# Patient Record
Sex: FEMALE | Race: WHITE | NOT HISPANIC OR LATINO | Employment: FULL TIME | URBAN - METROPOLITAN AREA
[De-identification: names, ages, dates, MRNs, and addresses within clinical notes are randomized per-mention and may not be internally consistent; named-entity substitution may affect disease eponyms.]

---

## 2023-10-19 ENCOUNTER — OFFICE VISIT (OUTPATIENT)
Dept: OBGYN CLINIC | Facility: CLINIC | Age: 48
End: 2023-10-19

## 2023-10-19 VITALS
SYSTOLIC BLOOD PRESSURE: 140 MMHG | HEIGHT: 65 IN | HEART RATE: 72 BPM | WEIGHT: 176.6 LBS | DIASTOLIC BLOOD PRESSURE: 83 MMHG | BODY MASS INDEX: 29.42 KG/M2

## 2023-10-19 DIAGNOSIS — M75.101 ROTATOR CUFF SYNDROME, RIGHT: Primary | ICD-10-CM

## 2023-10-19 DIAGNOSIS — M25.511 ACUTE PAIN OF RIGHT SHOULDER: ICD-10-CM

## 2023-10-19 RX ORDER — BUPIVACAINE HYDROCHLORIDE 2.5 MG/ML
4 INJECTION, SOLUTION INFILTRATION; PERINEURAL
Status: COMPLETED | OUTPATIENT
Start: 2023-10-19 | End: 2023-10-19

## 2023-10-19 RX ORDER — BETAMETHASONE SODIUM PHOSPHATE AND BETAMETHASONE ACETATE 3; 3 MG/ML; MG/ML
6 INJECTION, SUSPENSION INTRA-ARTICULAR; INTRALESIONAL; INTRAMUSCULAR; SOFT TISSUE
Status: COMPLETED | OUTPATIENT
Start: 2023-10-19 | End: 2023-10-19

## 2023-10-19 RX ADMIN — BETAMETHASONE SODIUM PHOSPHATE AND BETAMETHASONE ACETATE 6 MG: 3; 3 INJECTION, SUSPENSION INTRA-ARTICULAR; INTRALESIONAL; INTRAMUSCULAR; SOFT TISSUE at 10:00

## 2023-10-19 RX ADMIN — BUPIVACAINE HYDROCHLORIDE 4 ML: 2.5 INJECTION, SOLUTION INFILTRATION; PERINEURAL at 10:00

## 2023-10-19 NOTE — PROGRESS NOTES
Chief Complaint: Right shoulder and arm pain    HPI:    Marcel Crum is a 46year old Female who presents today for evaluation of right shoulder and arm pain. Patient is right-hand dominant. Description of symptoms: Patient reports a work-related injury sustained on 9/15/2023. 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 She does not recollect acute popping but has subsequently noted intermittent popping sensation of the right shoulder. Does not have any known history of previous right shoulder or arm pain. Does not have any known history of previous right shoulder injury or surgery. I have personally reviewed pertinent films in PACS and my interpretation is plain radiograph of the right shoulder performed today does not reveal any acute osseous injury. Minimal acromioclavicular degenerative change noted. .    There is no problem list on file for this patient. No current outpatient medications on file prior to visit. No current facility-administered medications on file prior to visit.         Allergies   Allergen Reactions    Ja Flavor - Food Allergy Hives, Itching, Lip Swelling, Swelling and Tongue Swelling        Tobacco Use: Low Risk  (10/19/2023)    Patient History     Smoking Tobacco Use: Never     Smokeless Tobacco Use: Never     Passive Exposure: Not on file        Social Determinants of Health     Tobacco Use: Low Risk  (10/19/2023)    Patient History     Smoking Tobacco Use: Never     Smokeless Tobacco Use: Never     Passive Exposure: Not on file   Alcohol Use: Not on file   Financial Resource Strain: Not on file   Food Insecurity: Not on file   Transportation Needs: Not on file   Physical Activity: Not on file   Stress: Not on file   Social Connections: Not on file   Intimate Partner Violence: Not on file   Depression: Not on file   Housing Stability: Not on file   Utilities: Not on file               Review of Systems     Body mass index is 29.39 kg/m². Physical Exam  Vitals and nursing note reviewed. HENT:      Head: Atraumatic. Eyes:      Conjunctiva/sclera: Conjunctivae normal.   Cardiovascular:      Rate and Rhythm: Normal rate. Pulses: Normal pulses. Pulmonary:      Effort: Pulmonary effort is normal. No respiratory distress. Neurological:      Mental Status: She is alert and oriented to person, place, and time. Psychiatric:         Mood and Affect: Mood normal.         Behavior: Behavior normal.          Ortho Exam:    Body part: right shoulder    Inspection: No clinically visible deformity    Palpation: Tenderness to palpation over the subacromial space, long head of the biceps and mild tenderness over the acromioclavicular joint    Range of motion: Forward flexion is 160 degrees, abduction is 150 degrees, internal rotation is at the T12 level with discomfort. Normal cross adduction with some discomfort, external rotation in adduction is 75 degrees. Special Tests: Positive Stoddard. Positive Neer's. No apprehension. Minimal discomfort with acromioclavicular Jobes compression test.  Mild discomfort with empty can test.  No discomfort with belly press test.  Rotator cuff strength is supraspinatus 4+/5, subscapularis 5/5, infraspinatus 4+/5, teres minor 5/5. Distal Neurovascular Status: Intact, Yes    Large joint arthrocentesis: R subacromial bursa  Universal Protocol:  Consent: Verbal consent obtained. Risks and benefits: risks, benefits and alternatives were discussed  Consent given by: patient  Patient understanding: patient states understanding of the procedure being performed  Site marked: the operative site was marked  Radiology Images displayed and confirmed.  If images not available, report reviewed: imaging studies available  Required items: required blood products, implants, devices, and special equipment available  Patient identity confirmed: verbally with patient  Supporting Documentation  Indications: pain   Procedure Details  Location: shoulder - R subacromial bursa  Preparation: Patient was prepped and draped in the usual sterile fashion  Needle size: 22 G  Ultrasound guidance: no  Approach: lateral  Medications administered: 4 mL bupivacaine 0.25 %; 6 mg betamethasone acetate-betamethasone sodium phosphate 6 (3-3) mg/mL    Patient tolerance: patient tolerated the procedure well with no immediate complications  Dressing:  Sterile dressing applied             Assessment:     Diagnosis ICD-10-CM Associated Orders   1. Rotator cuff syndrome, right  M75.101 Ambulatory Referral to Physical Therapy      2. Acute pain of right shoulder  M25.511 Ambulatory Referral to Physical Therapy           Plan:    Explained my current clinical findings and reviewed the radiological findings with the patient today. Her evaluation is consistent with a right rotator cuff syndrome. She was agreeable for trial of right subacromial cortisone injection which was performed on today's office visit without immediate complications. I will also refer her for course of physical therapy rehabilitation. We will see her back in about 7 weeks if symptoms serially persist we will consider doing MRI of the right shoulder. Patient expressed understanding and was in agreement with the treatment plan. Portions of the record may have been created with voice recognition software. Occasional wrong word or "sound alike" substitutions may have occurred due to the inherent limitations of voice recognition software. Please review the chart carefully and recognize, using context, where substitutions/typographical errors may have occurred.

## 2023-11-03 ENCOUNTER — EVALUATION (OUTPATIENT)
Dept: PHYSICAL THERAPY | Facility: CLINIC | Age: 48
End: 2023-11-03
Payer: OTHER MISCELLANEOUS

## 2023-11-03 DIAGNOSIS — M54.12 CERVICAL RADICULOPATHY: Primary | ICD-10-CM

## 2023-11-03 DIAGNOSIS — M25.511 ACUTE PAIN OF RIGHT SHOULDER: ICD-10-CM

## 2023-11-03 DIAGNOSIS — M75.101 ROTATOR CUFF SYNDROME, RIGHT: ICD-10-CM

## 2023-11-03 PROCEDURE — 97140 MANUAL THERAPY 1/> REGIONS: CPT | Performed by: PHYSICAL THERAPIST

## 2023-11-03 PROCEDURE — 97012 MECHANICAL TRACTION THERAPY: CPT | Performed by: PHYSICAL THERAPIST

## 2023-11-03 PROCEDURE — 97162 PT EVAL MOD COMPLEX 30 MIN: CPT | Performed by: PHYSICAL THERAPIST

## 2023-11-03 NOTE — PROGRESS NOTES
PT Evaluation     Today's date: 11/3/2023  Patient name: Allie Jefferson  : 1975  MRN: [id number]  Referring provider: Dusty Carmichael MD  Dx:   Encounter Diagnosis     ICD-10-CM    1. Rotator cuff syndrome, right  M75.101 Ambulatory Referral to Physical Therapy      2. Acute pain of right shoulder  M25.511 Ambulatory Referral to Physical Therapy                     Assessment  Assessment details: Signs and symptoms are consistent with cervical radiculopathy due to decreased cervical ROM, impaired RUE strength and cervical extension preference. The patient has difficulty with reaching, lifting and sleeping and this is limiting her ability to complete ADLs and work activities. The patient has limited CS ROM so this will be the first thing we focus on as we begin PT. As this improves we will then focus on improving her postural strength to help better support the CS. The patient would benefit from PT to help restore ROM, strength and joint mobility. Impairments: abnormal or restricted ROM, activity intolerance, impaired physical strength, pain with function and poor posture   Understanding of Dx/Px/POC: excellent  Goals  STG : (2 weeks) - Patient demonstrates independence with HEP to be able to transition to HEP in the long term. (2 weeks) - Decrease patient's pain by 50%. (4 weeks) - Improve cervical ROM to [facility] to decrease stress on the cervical spine. LTG: (6 weeks) - The patient is able to reach above head with minimal pain. (8 weeks) - Improve patient's postural and shoulder strength to at least 4/5 to allow the patient to complete job activities with minimal pain.          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 The patient had a pop in the R shoulder and she had some pretty immediate pain. The patient went to see the doctor and the doctor believes that it is not a severe tear because she still has decent ROM of the shoulder. The patient had numbness in her RUE for about a week after the incident but this has gotten better. She still will get numbness if she over uses her shoulder. The patient has issues with sleeping, reaching, lifting and getting dressed. Sleeping will sometimes cause her arm to go numb. The patient recently saw the doctor and had a cortisone injection and this seemed to help with her pain. Her pain is constant but it fluctuates based off of what she is doing. The patient has been taking ibuprofen and this seems to help some. Originally after the injury she was using heat and ice but has not done this as much recently. Quality of life: excellent    Patient Goals  Patient goal: The patient wants her arm to get stronger and have less pain during ADLs/work activties  Pain  Current pain ratin  At best pain ratin  At worst pain ratin  Location: R posterior shoulder pain. Quality: throbbing  Relieving factors: medications and heat          Objective     Static Posture     Head  Forward. Shoulders  Rounded.     Active Range of Motion   Cervical/Thoracic Spine       Cervical    Flexion: 50 degrees  with pain  Extension: 45 degrees     with pain  Left lateral flexion: 25 degrees     with pain  Right lateral flexion: 25 degrees     with pain  Left rotation: 52 degrees with pain  Right rotation: 55 degrees     Left Shoulder   Normal active range of motion    Right Shoulder   Normal active range of motion  Mechanical Assessment    Cervical    Seated retraction: repeated movements Pain location: centralized  Pain intensity: worse  Seated Flexion:  repeated movements  Pain location: no change    Thoracic      Lumbar      Strength/Myotome Testing     Left Shoulder     Planes of Motion   Flexion: 4-   Extension: 3+   Abduction: 4-   External rotation at 0°: 4-   Internal rotation at 0°: 4     Right Shoulder     Planes of Motion   Flexion: 3+   Extension: 3+   Abduction: 4-   External rotation at 0°: 4-   Internal rotation at 0°: 4     Left Elbow   Flexion: 4-  Extension: 4    Right Elbow   Flexion: 4-  Extension: 4    Additional Strength Details  Pain with R shoulder flexion, ABD and ER             Precautions:     Diagnosis:    Precautions:    Primary Goals:    *asterisks by exercise = given for HEP   Manuals 11/3       CS traction AB                                       There Ex                                                                                Neuro Re-Ed                                                                                                         Re-evaluation              Ther Act                                         Modalities             [facility] 26# max, 13# min

## 2023-11-07 ENCOUNTER — OFFICE VISIT (OUTPATIENT)
Dept: PHYSICAL THERAPY | Facility: CLINIC | Age: 48
End: 2023-11-07
Payer: OTHER MISCELLANEOUS

## 2023-11-07 DIAGNOSIS — M25.511 ACUTE PAIN OF RIGHT SHOULDER: ICD-10-CM

## 2023-11-07 DIAGNOSIS — M54.12 CERVICAL RADICULOPATHY: Primary | ICD-10-CM

## 2023-11-07 PROCEDURE — 97140 MANUAL THERAPY 1/> REGIONS: CPT | Performed by: PHYSICAL THERAPIST

## 2023-11-07 PROCEDURE — 97012 MECHANICAL TRACTION THERAPY: CPT | Performed by: PHYSICAL THERAPIST

## 2023-11-07 PROCEDURE — 97110 THERAPEUTIC EXERCISES: CPT | Performed by: PHYSICAL THERAPIST

## 2023-11-07 NOTE — PROGRESS NOTES
Daily Note     Today's date: 2023  Patient name: Leslee Bearden  : 1975  MRN: [id number]  Referring provider: Linda Kelley MD  Dx:   Encounter Diagnosis     ICD-10-CM    1. Cervical radiculopathy  M54.12       2. Acute pain of right shoulder  M25.511                      Subjective: The patient reports that after the traction last session she felt a little better the next day. Objective: See treatment diary below      Assessment: Tolerated treatment well. Patient demonstrated fatigue post treatment and would benefit from continued PT    The patient responded very well to manual techniques today as it helped to centralize her symptoms. We went through a few exercises that were all added to her HEP before ending the session with mechanical traction. Plan: Continue per plan of care.       Precautions:     Diagnosis:    Precautions:    Primary Goals:    *asterisks by exercise = given for HEP   Manuals 11/3 11/7      CS traction AB       STM mid/upper trap  AB      TS PA mobs  Gr III-IV      Man CS ret  AB              There Ex        CS ret  x20      Upper TS ext  5"x15                                                              Neuro Re-Ed        Rows  RTB x20                                                                                               Re-evaluation              Ther Act                                         Modalities             Mech Traction Intermittent 26# max, 13# min Intermittent 26# max, 13# min

## 2023-11-09 ENCOUNTER — OFFICE VISIT (OUTPATIENT)
Dept: PHYSICAL THERAPY | Facility: CLINIC | Age: 48
End: 2023-11-09
Payer: OTHER MISCELLANEOUS

## 2023-11-09 DIAGNOSIS — M25.511 ACUTE PAIN OF RIGHT SHOULDER: ICD-10-CM

## 2023-11-09 DIAGNOSIS — M54.12 CERVICAL RADICULOPATHY: Primary | ICD-10-CM

## 2023-11-09 PROCEDURE — 97140 MANUAL THERAPY 1/> REGIONS: CPT | Performed by: PHYSICAL THERAPIST

## 2023-11-09 PROCEDURE — 97112 NEUROMUSCULAR REEDUCATION: CPT | Performed by: PHYSICAL THERAPIST

## 2023-11-09 PROCEDURE — 97012 MECHANICAL TRACTION THERAPY: CPT | Performed by: PHYSICAL THERAPIST

## 2023-11-09 NOTE — PROGRESS NOTES
Daily Note     Today's date: 2023  Patient name: Marcel Crum  : 1975  MRN: [id number]  Referring provider: Luke Carmichael MD  Dx:   Encounter Diagnosis     ICD-10-CM    1. Cervical radiculopathy  M54.12       2. Acute pain of right shoulder  M25.511                      Subjective: The patient reports that her shoulder is feeling better and is now only a 5-6/10 pain. Objective: See treatment diary below      Assessment: Tolerated treatment well. Patient demonstrated fatigue post treatment and would benefit from continued PT        Plan: Continue per plan of care.       Precautions:     Diagnosis:    Precautions:    Primary Goals:    *asterisks by exercise = given for HEP   Manuals 11/3 11/7 11/9     CS traction AB       STM mid/upper trap  AB AA     TS PA mobs  Gr III-IV Gr III-IV     Man CS ret  AB AB             There Ex        CS ret  x20      Upper TS ext  5"x15                                                              Neuro Re-Ed        Rows  RTB x20      No Moneys   GTB x20     Horizontal ABD   GTB x20     Prone I's   5"x10     Prone T's   5"x10                                                              Re-evaluation              Ther Act                                         Modalities             Mech Traction Intermittent 26# max, 13# min Intermittent 26# max, 13# min Intermittent 26# max, 13# min

## 2023-11-13 ENCOUNTER — APPOINTMENT (OUTPATIENT)
Dept: URGENT CARE | Facility: CLINIC | Age: 48
End: 2023-11-13

## 2023-11-14 ENCOUNTER — APPOINTMENT (OUTPATIENT)
Dept: PHYSICAL THERAPY | Facility: CLINIC | Age: 48
End: 2023-11-14
Payer: OTHER MISCELLANEOUS

## 2023-11-16 ENCOUNTER — OFFICE VISIT (OUTPATIENT)
Dept: PHYSICAL THERAPY | Facility: CLINIC | Age: 48
End: 2023-11-16
Payer: OTHER MISCELLANEOUS

## 2023-11-16 DIAGNOSIS — M25.511 ACUTE PAIN OF RIGHT SHOULDER: ICD-10-CM

## 2023-11-16 DIAGNOSIS — M54.12 CERVICAL RADICULOPATHY: Primary | ICD-10-CM

## 2023-11-16 PROCEDURE — 97012 MECHANICAL TRACTION THERAPY: CPT | Performed by: PHYSICAL THERAPIST

## 2023-11-16 PROCEDURE — 97110 THERAPEUTIC EXERCISES: CPT | Performed by: PHYSICAL THERAPIST

## 2023-11-16 PROCEDURE — 97140 MANUAL THERAPY 1/> REGIONS: CPT | Performed by: PHYSICAL THERAPIST

## 2023-11-16 PROCEDURE — 97112 NEUROMUSCULAR REEDUCATION: CPT | Performed by: PHYSICAL THERAPIST

## 2023-11-16 NOTE — PROGRESS NOTES
Daily Note     Today's date: 2023  Patient name: Anamaria Mahmood  : 1975  MRN: [id number]  Referring provider: Jessica Dean MD  Dx: No diagnosis found. Subjective: Patient reports major improvements in pain and numbness that was going down her arm. She states that the only thing she feels is some tightness at the end of working a shift but she does some stretches to relieve it. Objective: See treatment diary below      Assessment: Tolerated treatment well. Patient would benefit from continued PT    Patient continues to make progress toward goals. Since patient is showing improvements in symptoms, the plan is to add in more strengthening exercises next visit to improve muscle endurance and activity tolerance for working,    Plan: Continue per plan of care.       Precautions:     Diagnosis:    Precautions:    Primary Goals:    *asterisks by exercise = given for HEP   Manuals 11/3 11/7 11/9 11/16    CS traction AB       STM mid/upper trap  AB AA AA    TS PA mobs  Gr III-IV Gr III-IV Gr III-Iv    Man CS ret  AB AB AA            There Ex        CS ret  x20  x20    Upper TS ext  5"x15  5"x15    UT stretch    10x10"                                                    Neuro Re-Ed        Rows  RTB x20  12.5# x20    No Moneys   GTB x20 GTB  x20    Horizontal ABD   GTB x20 GTB x20    Prone I's   5"x10 5"x10    Prone T's   5"x10 5"x10                                                             Re-evaluation              Ther Act                                         Modalities             Mech Traction Intermittent 26# max, 13# min Intermittent 26# max, 13# min Intermittent 26# max, 13# min ntermittent 26# max, 13# min

## 2023-11-20 ENCOUNTER — OFFICE VISIT (OUTPATIENT)
Dept: PHYSICAL THERAPY | Facility: CLINIC | Age: 48
End: 2023-11-20
Payer: OTHER MISCELLANEOUS

## 2023-11-20 DIAGNOSIS — M54.12 CERVICAL RADICULOPATHY: Primary | ICD-10-CM

## 2023-11-20 DIAGNOSIS — M25.511 ACUTE PAIN OF RIGHT SHOULDER: ICD-10-CM

## 2023-11-20 PROCEDURE — 97012 MECHANICAL TRACTION THERAPY: CPT | Performed by: PHYSICAL THERAPIST

## 2023-11-20 PROCEDURE — 97140 MANUAL THERAPY 1/> REGIONS: CPT | Performed by: PHYSICAL THERAPIST

## 2023-11-20 PROCEDURE — 97112 NEUROMUSCULAR REEDUCATION: CPT | Performed by: PHYSICAL THERAPIST

## 2023-11-20 NOTE — PROGRESS NOTES
Daily Note     Today's date: 2023  Patient name: Leslee Bearden  : 1975  MRN: [id number]  Referring provider: Linda Kelley MD  Dx:   Encounter Diagnosis     ICD-10-CM    1. Cervical radiculopathy  M54.12       2. Acute pain of right shoulder  M25.511                      Subjective: The patient reports that her neck is feeling much better and is only having 3/10 in the upper trap. Objective: See treatment diary below      Assessment: Tolerated treatment well. Patient demonstrated fatigue post treatment and would benefit from continued PT    The patient responds very well to manual techniques as it helps reduce tightness and pain levels. Her postural strength is improving so I added in around the [place] and she demonstrated great scapular activation. Plan: Continue per plan of care.       Precautions:     Diagnosis:    Precautions:    Primary Goals:    *asterisks by exercise = given for HEP   Manuals 11/3 11/7 11/9 11/16 11/20   CS traction AB       STM mid/upper trap  AB AA AA AB   TS PA mobs  Gr III-IV Gr III-IV Gr III-Iv Gr III-IV   Man CS ret  AB AB AA AB           There Ex        CS ret  x20  x20    Upper TS ext  5"x15  5"x15 5"X15   UT stretch    10x10"                                                    Neuro Re-Ed        Rows  RTB x20  12.5# x20    No Moneys   GTB x20 GTB  x20 GTB x20   Horizontal ABD   GTB x20 GTB x20 GTB x20   Prone I's   5"x10 5"x10 5"x10   Prone T's   5"x10 5"x10 5"x10   Around [place]     GTB x10                                                    Re-evaluation              Ther Act                                         Modalities             Mech Traction Intermittent 26# max, 13# min Intermittent 26# max, 13# min Intermittent 26# max, 13# min intermittent 26# max, 13# min Intermittent 26# max, 13# min

## 2023-11-22 ENCOUNTER — OFFICE VISIT (OUTPATIENT)
Dept: PHYSICAL THERAPY | Facility: CLINIC | Age: 48
End: 2023-11-22
Payer: OTHER MISCELLANEOUS

## 2023-11-22 DIAGNOSIS — M25.511 ACUTE PAIN OF RIGHT SHOULDER: ICD-10-CM

## 2023-11-22 DIAGNOSIS — M54.12 CERVICAL RADICULOPATHY: Primary | ICD-10-CM

## 2023-11-22 PROCEDURE — 97110 THERAPEUTIC EXERCISES: CPT | Performed by: PHYSICAL THERAPIST

## 2023-11-22 PROCEDURE — 97012 MECHANICAL TRACTION THERAPY: CPT | Performed by: PHYSICAL THERAPIST

## 2023-11-22 PROCEDURE — 97140 MANUAL THERAPY 1/> REGIONS: CPT | Performed by: PHYSICAL THERAPIST

## 2023-11-22 PROCEDURE — 97112 NEUROMUSCULAR REEDUCATION: CPT | Performed by: PHYSICAL THERAPIST

## 2023-11-22 NOTE — PROGRESS NOTES
Daily Note     Today's date: 2023  Patient name: Kimberly Landeros  : 1975  MRN: [id number]  Referring provider: Angie Price MD  Dx:   Encounter Diagnosis     ICD-10-CM    1. Cervical radiculopathy  M54.12       2. Acute pain of right shoulder  M25.511           Start Time: 1415  Stop Time: 1505  Total time in clinic (min): 50 minutes    Treatment provided by Myra Gómez SPT under direct supervision of Nicolas Choi, PT, DPT. Subjective: Patient says she feels good today. She believes she will be okay to discharge next week. The only thing symptoms she feel are residual soreness after a long day of work. Objective: See treatment diary below      Assessment: Tolerated treatment well. Patient would benefit from continued PT    Patient is showing improvements in pain and strength evidenced by a progression to BTB for no moneys. Wall angels were added in today which the patient performed well with minimal cueing. Plan: Continue per plan of care. Plan for discharge next week.      Precautions:   Diagnosis:    Precautions:    Primary Goals:    *asterisks by exercise = given for HEP   Manuals    CS traction        STM mid/upper trap AA AB AA AA AB   TS PA mobs AA Gr III-IV Gr III-IV Gr III-Iv Gr III-IV   Man CS ret AA AB AB AA AB           There Ex        CS ret x20 x20  x20    Upper TS ext 15x5" 5"x15  5"x15 5"X15   UT stretch    10x10"                                                    Neuro Re-Ed        Rows 12.5# x20 RTB x20  12.5# x20    No Moneys BTB x20  GTB x20 GTB  x20 GTB x20   Horizontal ABD GTB x20  GTB x20 GTB x20 GTB x20   Prone I's   5"x10 5"x10 5"x10   Prone T's   5"x10 5"x10 5"x10   Around [place] GTB x10    GTB x10   Wall [place] x20                                                Re-evaluation              Ther Act                                         Modalities             Mech Traction Intermittent 26# max, 13# min Intermittent 26# max, 13# min Intermittent 26# max, 13# min intermittent 26# max, 13# min Intermittent 26# max, 13# min

## 2023-11-28 ENCOUNTER — OFFICE VISIT (OUTPATIENT)
Dept: PHYSICAL THERAPY | Facility: CLINIC | Age: 48
End: 2023-11-28
Payer: OTHER MISCELLANEOUS

## 2023-11-28 DIAGNOSIS — M54.12 CERVICAL RADICULOPATHY: Primary | ICD-10-CM

## 2023-11-28 DIAGNOSIS — M25.511 ACUTE PAIN OF RIGHT SHOULDER: ICD-10-CM

## 2023-11-28 PROCEDURE — 97140 MANUAL THERAPY 1/> REGIONS: CPT | Performed by: PHYSICAL THERAPIST

## 2023-11-28 PROCEDURE — 97012 MECHANICAL TRACTION THERAPY: CPT | Performed by: PHYSICAL THERAPIST

## 2023-11-28 PROCEDURE — 97110 THERAPEUTIC EXERCISES: CPT | Performed by: PHYSICAL THERAPIST

## 2023-11-28 PROCEDURE — 97112 NEUROMUSCULAR REEDUCATION: CPT | Performed by: PHYSICAL THERAPIST

## 2023-11-28 NOTE — PROGRESS NOTES
Daily Note     Today's date: 2023  Patient name: Chula Keller  : 1975  MRN: [id number]  Referring provider: Oscar Jara MD  Dx:   Encounter Diagnosis     ICD-10-CM    1. Cervical radiculopathy  M54.12       2. Acute pain of right shoulder  M25.511           Start Time: 1700  Stop Time: 1755  Total time in clinic (min): 55 minutes  Treatment provided by Morena Subramanian SPT under direct supervision of Maurice Kaur, PT, DPT. Subjective: Patient reports her neck and shoulder has been feeling good consistently. Objective: See treatment diary below      Assessment: Tolerated treatment well. Patient would benefit from continued PT    Patient continues to show improvements in pain, cervical ROM, and strength. She is able to complete the exercises with little to no pain and demonstrates good technique with occasional cueing. Plan: Potential discharge next visit.      Precautions:   Diagnosis:    Precautions:    Primary Goals:    *asterisks by exercise = given for HEP   Manuals    CS traction        STM mid/upper trap AA AA AA AA AB   TS PA mobs AA Gr III-IV Gr III-IV Gr III-Iv Gr III-IV   Man CS ret AA AA AB AA AB           There Ex        CS ret x20 x20  x20    Upper TS ext 15x5" 5"x15  5"x15 5"X15   UT stretch    10x10"                                                    Neuro Re-Ed        Rows 12.5# x20 12.5# x20  12.5# x20    No Moneys BTB x20 BTB x20 GTB x20 GTB  x20 GTB x20   Horizontal ABD GTB x20 GTB x20 GTB x20 GTB x20 GTB x20   Prone I's  5"x15 5"x10 5"x10 5"x10   Prone T's  5"x15 5"x10 5"x10 5"x10   Around the [place] GTB x10 GTB x15   GTB x10   Wall [place] x20 x20                                               Re-evaluation              Ther Act                                         Modalities             Mech Traction Intermittent 26# max, 13# min Intermittent 26# max, 13# min Intermittent 26# max, 13# min intermittent 26# max, 13# min Intermittent 26# max, 13# min

## 2023-11-30 ENCOUNTER — APPOINTMENT (OUTPATIENT)
Dept: PHYSICAL THERAPY | Facility: CLINIC | Age: 48
End: 2023-11-30
Payer: OTHER MISCELLANEOUS

## 2023-12-04 ENCOUNTER — OFFICE VISIT (OUTPATIENT)
Dept: OBGYN CLINIC | Facility: OTHER | Age: 48
End: 2023-12-04
Payer: OTHER MISCELLANEOUS

## 2023-12-04 VITALS
HEART RATE: 63 BPM | BODY MASS INDEX: 29.42 KG/M2 | HEIGHT: 65 IN | DIASTOLIC BLOOD PRESSURE: 79 MMHG | SYSTOLIC BLOOD PRESSURE: 116 MMHG | WEIGHT: 176.6 LBS

## 2023-12-04 DIAGNOSIS — M75.101 ROTATOR CUFF SYNDROME, RIGHT: Primary | ICD-10-CM

## 2023-12-04 PROCEDURE — 99213 OFFICE O/P EST LOW 20 MIN: CPT | Performed by: ORTHOPAEDIC SURGERY

## 2023-12-04 NOTE — PROGRESS NOTES
Chief Complaint: Right shoulder pain follow-up    HPI:    Yessi Bean is a 46year old Female who presents today for follow-up of right shoulder pain      Description of symptoms: Last seen in this regard on 10/19/2023. Patient reports a work-related injury sustained on 9/15/2023. 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 Clinical evaluation was consistent with right rotator cuff syndrome. Patient received a right subacromial cortisone injection at the last office visit. She has also been doing physical therapy rehabilitation. Today, the patient reports that she has good improvement of her symptoms following the above management. Currently denies any significant neck or shoulder pain and denies any functional limitation of the right upper extremity. I have personally reviewed pertinent films in PACS. There is no problem list on file for this patient. No current outpatient medications on file prior to visit. No current facility-administered medications on file prior to visit.         Allergies   Allergen Reactions    [place] Flavor - Food Allergy Hives, Itching, Lip Swelling, Swelling and Tongue Swelling        Tobacco Use: Low Risk  (11/3/2023)    Patient History     Smoking Tobacco Use: Never     Smokeless Tobacco Use: Never     Passive Exposure: Not on file        Social Determinants of Health     Tobacco Use: Low Risk  (11/3/2023)    Patient History     Smoking Tobacco Use: Never     Smokeless Tobacco Use: Never     Passive Exposure: Not on file   Alcohol Use: Not on file   Financial Resource Strain: Not on file   Food Insecurity: Not on file   Transportation Needs: Not on file   Physical Activity: Not on file   Stress: Not on file   Social Connections: Not on file   Intimate Partner Violence: Not on file   Depression: Not on file   Housing Stability: Not on file   Utilities: Not on file               Review of Systems     There is no height or weight on file to calculate BMI. Physical Exam  Vitals and nursing note reviewed. HENT:      Head: Atraumatic. Eyes:      Conjunctiva/sclera: Conjunctivae normal.   Cardiovascular:      Rate and Rhythm: Normal rate. Pulses: Normal pulses. Pulmonary:      Effort: Pulmonary effort is normal. No respiratory distress. Neurological:      Mental Status: She is alert and oriented to person, place, and time. Psychiatric:         Mood and Affect: Mood normal.         Behavior: Behavior normal.          Ortho Exam:    Body part: right shoulder    Inspection: No deformity or muscle atrophy    Palpation: No areas of tenderness    Range of motion: Full range of right shoulder motion in all directions    Special Tests: Rotator cuff strength is 5/5 in all groups. Negative Neer's. Negative Stoddard. No anterior apprehension. Negative acromioclavicular Teresa's compression test.  Negative Morgan's. Distal Neurovascular Status: Intact, Yes    Body part: Cervical spine    Inspection: No deformity    Palpation: No midline tenderness    Range of motion: Full range of cervical spine motion in all directions    Special Tests: Negative Spurling's maneuver bilaterally. Negative cervical axial load test.    Distal Neurovascular Status: Intact, Yes    Procedures       Assessment:     Diagnosis ICD-10-CM Associated Orders   1. Rotator cuff syndrome, right  M75.101            Plan:    Explained my current clinical findings to the patient. She has had good relief of her right shoulder pain following physical therapy and subacromial cortisone injection. At this time I have advised her to continue with home-based exercises. She will follow-up on an as-needed basis. Patient expressed understanding and was in agreement with the treatment plan. Portions of the record may have been created with voice recognition software.  Occasional wrong word or "sound alike" substitutions may have occurred due to the inherent limitations of voice recognition software. Please review the chart carefully and recognize, using context, where substitutions/typographical errors may have occurred.

## 2023-12-06 ENCOUNTER — OFFICE VISIT (OUTPATIENT)
Dept: PHYSICAL THERAPY | Facility: CLINIC | Age: 48
End: 2023-12-06
Payer: OTHER MISCELLANEOUS

## 2023-12-06 DIAGNOSIS — M54.12 CERVICAL RADICULOPATHY: Primary | ICD-10-CM

## 2023-12-06 DIAGNOSIS — M25.511 ACUTE PAIN OF RIGHT SHOULDER: ICD-10-CM

## 2023-12-06 PROCEDURE — 97140 MANUAL THERAPY 1/> REGIONS: CPT | Performed by: PHYSICAL THERAPIST

## 2023-12-06 PROCEDURE — 97110 THERAPEUTIC EXERCISES: CPT | Performed by: PHYSICAL THERAPIST

## 2023-12-06 NOTE — PROGRESS NOTES
PT Discharge    Today's date: 2023  Patient name: Kimberly Landeros  : 1975  MRN: [id number]  Referring provider: Angie Price MD  Dx:   Encounter Diagnosis     ICD-10-CM    1. Cervical radiculopathy  M54.12       2. Acute pain of right shoulder  M25.511                      Assessment  Assessment details: The patient has made great progress since the start of PT. Her CS ROM is doing great and she only had minor discomfort with right side bending. Her strength is also significiantly better and this is helping to support her during ADLs and work activities. Her pain levels are much lower and under control. Overall the patient is doing excellent so me and the patient both agree she is ready to be discharged. Impairments: abnormal or restricted ROM, activity intolerance, impaired physical strength, pain with function and poor posture   Understanding of Dx/Px/POC: excellent  Goals  STG : (2 weeks) - Patient demonstrates independence with HEP to be able to transition to [facility] in the long term. - 100%  (2 weeks) - Decrease patient's pain by 50%. - 100%  (4 weeks) - Improve cervical ROM to [facility] to decrease stress on the cervical spine. - 100%    LTG: (6 weeks) - The patient is able to reach above head with minimal pain. - 100%  (8 weeks) - Improve patient's postural and shoulder strength to at least 4/5 to allow the patient to complete job activities with minimal pain. 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   The patient had a pop in the R shoulder and she had some pretty immediate pain. The patient went to see the doctor and the doctor believes that it is not a severe tear because she still has decent ROM of the shoulder. The patient had numbness in her RUE for about a week after the incident but this has gotten better. She still will get numbness if she over uses her shoulder. The patient has issues with sleeping, reaching, lifting and getting dressed. Sleeping will sometimes cause her arm to go numb. The patient recently saw the doctor and had a cortisone injection and this seemed to help with her pain. Her pain is constant but it fluctuates based off of what she is doing. The patient has been taking ibuprofen and this seems to help some. Originally after the injury she was using heat and ice but has not done this as much recently. Re-evaluation - The patient reports that her shoulder and neck have been feeling very good. She is having minimal pain and is able to complete her ADLs and work activities with minimal difficulty. She did have some pain the other day when she was carrying heavy hernando decorations up and down the stairs for an hour but after a little rest she quickly recovered. The patent reports that she is feeling 98% better since the start of PT. Quality of life: excellent    Patient Goals  Patient goal: The patient wants her arm to get stronger and have less pain during ADLs/work activties  Pain  Current pain ratin  At best pain ratin  At worst pain rating: 3  Location: R posterior shoulder pain. Quality: throbbing  Relieving factors: medications and heat        Objective     Static Posture     Head  Forward. Shoulders  Rounded.     Active Range of Motion   Cervical/Thoracic Spine       Cervical    Flexion: 65 degrees   Extension: 70 degrees      Left lateral flexion: 40 degrees      Right lateral flexion: 40 degrees     with pain  Left rotation: 60 degrees  Right rotation: 60 degrees Left Shoulder   Normal active range of motion    Right Shoulder   Normal active range of motion  Mechanical Assessment    Cervical    Seated retraction: repeated movements   Pain location: centralized  Pain intensity: worse  Seated Flexion:  repeated movements  Pain location: no change    Thoracic      Lumbar      Strength/Myotome Testing     Left Shoulder     Planes of Motion   Flexion: 4   Extension: 4   Abduction: 4   External rotation at 0°: 4   Internal rotation at 0°: 4     Right Shoulder     Planes of Motion   Flexion: 4   Extension: 4   Abduction: 4   External rotation at 0°: 4   Internal rotation at 0°: 4+     Left Elbow   Flexion: 4  Extension: 4    Right Elbow   Flexion: 4  Extension: 4    Additional Strength Details  No pain with MMT             Precautions:     Diagnosis:    Precautions:    Primary Goals:    *asterisks by exercise = given for HEP   Manuals 11/22 11/28 11/9 11/16 11/20   CS traction        STM mid/upper trap AA AA AA AA AB   TS PA mobs AA Gr III-IV Gr III-IV Gr III-Iv Gr III-IV   Man CS ret AA AA AB AA AB           There Ex        CS ret x20 x20  x20    Upper TS ext 15x5" 5"x15  5"x15 5"X15   UT stretch    10x10"                                                    Neuro Re-Ed        Rows 12.5# x20 12.5# x20  12.5# x20    No Moneys BTB x20 BTB x20 GTB x20 GTB  x20 GTB x20   Horizontal ABD GTB x20 GTB x20 GTB x20 GTB x20 GTB x20   Prone I's  5"x15 5"x10 5"x10 5"x10   Prone T's  5"x15 5"x10 5"x10 5"x10   Around [employer] [place] GTB x10 GTB x15   GTB x10   Wall [place] x20 x20                                               Re-evaluation              Ther Act                                         Modalities             Mech Traction Intermittent 26# max, 13# min Intermittent 26# max, 13# min Intermittent 26# max, 13# min intermittent 26# max, 13# min Intermittent 26# max, 13# min

## 2024-02-03 ENCOUNTER — HOSPITAL ENCOUNTER (EMERGENCY)
Facility: HOSPITAL | Age: 49
Discharge: HOME | End: 2024-02-03
Attending: EMERGENCY MEDICINE | Admitting: EMERGENCY MEDICINE
Payer: COMMERCIAL

## 2024-02-03 ENCOUNTER — APPOINTMENT (EMERGENCY)
Dept: RADIOLOGY | Facility: HOSPITAL | Age: 49
End: 2024-02-03
Payer: COMMERCIAL

## 2024-02-03 VITALS
OXYGEN SATURATION: 99 % | HEART RATE: 64 BPM | SYSTOLIC BLOOD PRESSURE: 135 MMHG | TEMPERATURE: 97.7 F | DIASTOLIC BLOOD PRESSURE: 90 MMHG | RESPIRATION RATE: 16 BRPM

## 2024-02-03 DIAGNOSIS — M54.32 SCIATICA OF LEFT SIDE: Primary | ICD-10-CM

## 2024-02-03 PROCEDURE — 99283 EMERGENCY DEPT VISIT LOW MDM: CPT

## 2024-02-03 PROCEDURE — 72100 X-RAY EXAM L-S SPINE 2/3 VWS: CPT

## 2024-02-03 PROCEDURE — 96372 THER/PROPH/DIAG INJ SC/IM: CPT

## 2024-02-03 RX ORDER — LIDOCAINE 50 MG/G
1 PATCH TOPICAL DAILY
Qty: 30 PATCH | Refills: 0 | Status: SHIPPED | OUTPATIENT
Start: 2024-02-03

## 2024-02-03 RX ORDER — METHOCARBAMOL 500 MG/1
500 TABLET, FILM COATED ORAL 3 TIMES DAILY PRN
Qty: 20 TABLET | Refills: 0 | Status: SHIPPED | OUTPATIENT
Start: 2024-02-03

## 2024-02-03 RX ORDER — NAPROXEN 500 MG/1
500 TABLET ORAL 2 TIMES DAILY WITH MEALS
Qty: 30 TABLET | Refills: 0 | Status: SHIPPED | OUTPATIENT
Start: 2024-02-03

## 2024-02-03 RX ORDER — ACETAMINOPHEN 325 MG/1
975 TABLET ORAL ONCE
Status: COMPLETED | OUTPATIENT
Start: 2024-02-03 | End: 2024-02-03

## 2024-02-03 RX ORDER — METHOCARBAMOL 500 MG/1
500 TABLET, FILM COATED ORAL ONCE
Status: COMPLETED | OUTPATIENT
Start: 2024-02-03 | End: 2024-02-03

## 2024-02-03 RX ORDER — LIDOCAINE 50 MG/G
2 PATCH TOPICAL ONCE
Status: DISCONTINUED | OUTPATIENT
Start: 2024-02-03 | End: 2024-02-03 | Stop reason: HOSPADM

## 2024-02-03 RX ORDER — ROPIVACAINE HYDROCHLORIDE 5 MG/ML
10 INJECTION, SOLUTION EPIDURAL; INFILTRATION; PERINEURAL ONCE
Status: COMPLETED | OUTPATIENT
Start: 2024-02-03 | End: 2024-02-03

## 2024-02-03 RX ORDER — ACETAMINOPHEN 500 MG
1000 TABLET ORAL EVERY 6 HOURS PRN
Qty: 40 TABLET | Refills: 0 | Status: SHIPPED | OUTPATIENT
Start: 2024-02-03

## 2024-02-03 RX ORDER — KETOROLAC TROMETHAMINE 30 MG/ML
15 INJECTION, SOLUTION INTRAMUSCULAR; INTRAVENOUS ONCE
Status: COMPLETED | OUTPATIENT
Start: 2024-02-03 | End: 2024-02-03

## 2024-02-03 RX ADMIN — ROPIVACAINE HYDROCHLORIDE 10 ML: 5 INJECTION, SOLUTION EPIDURAL; INFILTRATION; PERINEURAL at 13:08

## 2024-02-03 RX ADMIN — LIDOCAINE 2 PATCH: 50 PATCH CUTANEOUS at 13:03

## 2024-02-03 RX ADMIN — METHOCARBAMOL TABLETS 500 MG: 500 TABLET, COATED ORAL at 12:59

## 2024-02-03 RX ADMIN — KETOROLAC TROMETHAMINE 15 MG: 30 INJECTION, SOLUTION INTRAMUSCULAR; INTRAVENOUS at 13:00

## 2024-02-03 RX ADMIN — ACETAMINOPHEN 975 MG: 325 TABLET, FILM COATED ORAL at 12:59

## 2024-02-03 NOTE — DISCHARGE INSTRUCTIONS
Follow-up with [facility] us as possible.    Perform the stretching exercises and walk as often as possible.  Do not do any heavy lifting.    Take the naproxen every 12 hours, Tylenol every 6 hours, lidocaine patches 12 hours on and then a handful of hours off.    Use the Robaxin muscle relaxer 3 times daily as needed for muscle spasms.  This medication will make you tired.  Do not take it and drive.    Come back for neurological deficits such as numbness between the legs, urinary incontinence, urinary retention, bowel problems.

## 2024-02-03 NOTE — ED PROVIDER NOTES
History  Chief Complaint   Patient presents with    Back Pain     Pt presents to ED for back pain starting Tuesday after bending while doing laundry. Saw chiropractor on Friday without relief. OTC medications ineffective.      48-year-old female previous history of hysterectomy, leg surgery.  Patient presents for back pain for last 4 days.  Patient reports that she was leaning forward grabbing laundry 4 days ago.  She felt a pop in her back.  She notes pain primarily in the left paraspinal musculature over the lower lumbar region since then.    She notes tingling/different sensation on the lateral aspect of the left leg.  Notes that the pain makes her feel like her leg is giving out.  Denies subjective weakness.    She denies urinary or bowel complaints.  Denies numbness between the legs.  No cancer history.  Denies fevers.  Denies night sweats.    Has been to chiropractor for relief of symptoms for 1 day.     Has used naproxen and Voltaren without relief of symptoms.      Back Pain      None       History reviewed. No pertinent past medical history.    Past Surgical History:   Procedure Laterality Date    HYSTERECTOMY      LEG SURGERY Left        Family History   Problem Relation Age of Onset    Cancer Mother     Heart disease Father      I have reviewed and agree with the history as documented.    E-Cigarette/Vaping    E-Cigarette Use Never User      E-Cigarette/Vaping Substances     Social History     Tobacco Use    Smoking status: Never    Smokeless tobacco: Never   Vaping Use    Vaping status: Never Used   Substance Use Topics    Alcohol use: Yes    Drug use: Never       Review of Systems   Musculoskeletal:  Positive for back pain.   All other systems reviewed and are negative.      Physical Exam  Physical Exam  Vitals and nursing note reviewed.   Constitutional:       General: She is not in acute distress.     Appearance: Normal appearance. She is not ill-appearing.   HENT:      Head: Normocephalic and  atraumatic.      Right Ear: External ear normal.      Left Ear: External ear normal.      Nose: Nose normal.      Mouth/Throat:      Mouth: Mucous membranes are moist.   Eyes:      General:         Right eye: No discharge.         Left eye: No discharge.      Conjunctiva/sclera: Conjunctivae normal.   Cardiovascular:      Rate and Rhythm: Normal rate and regular rhythm.      Pulses: Normal pulses.      Heart sounds: No murmur heard.  Pulmonary:      Effort: Pulmonary effort is normal.      Breath sounds: Normal breath sounds.   Abdominal:      General: Abdomen is flat. There is no distension.      Tenderness: There is no abdominal tenderness.   Musculoskeletal:         General: Tenderness present. Normal range of motion.      Cervical back: Normal range of motion.      Comments: Positive straight leg raise on the left.    Tenderness with palpation in the paraspinal musculature in the lower lumbar region as well as mild spinal central tenderness around L4.     Skin:     General: Skin is warm.      Capillary Refill: Capillary refill takes less than 2 seconds.      Findings: No rash.   Neurological:      General: No focal deficit present.      Mental Status: She is alert. Mental status is at baseline.      Sensory: No sensory deficit.      Motor: No weakness.      Comments: Normal sensation to light touch at the lower extremities bilaterally.  Muscular strength is 5 out of 5 in lower extremities bilaterally.    No numbness to light touch within the medial thighs bilaterally.   Psychiatric:         Mood and Affect: Mood normal.         Behavior: Behavior normal.         Vital Signs  ED Triage Vitals   Temperature Pulse Respirations Blood Pressure SpO2   02/03/24 1234 02/03/24 1231 02/03/24 1231 02/03/24 1231 02/03/24 1231   97.7 °F (36.5 °C) 64 16 135/90 99 %      Temp Source Heart Rate Source Patient Position - Orthostatic VS BP Location FiO2 (%)   02/03/24 1231 02/03/24 1231 02/03/24 1231 02/03/24 1231 --   Oral  Monitor Sitting Left arm       Pain Score       02/03/24 1231       10 - Worst Possible Pain           Vitals:    02/03/24 1231   BP: 135/90   Pulse: 64   Patient Position - Orthostatic VS: Sitting         Visual Acuity      ED Medications  Medications   lidocaine (LIDODERM) 5 % patch 2 patch (2 patches Topical Medication Applied 2/3/24 1303)   acetaminophen (TYLENOL) tablet 975 mg (975 mg Oral Given 2/3/24 1259)   methocarbamol (ROBAXIN) tablet 500 mg (500 mg Oral Given 2/3/24 1259)   ketorolac (TORADOL) injection 15 mg (15 mg Intramuscular Given 2/3/24 1300)   ropivacaine (NAROPIN) 0.5 % injection 10 mL (10 mL Infiltration Given 2/3/24 1308)       Diagnostic Studies  Results Reviewed       None                   XR spine lumbar 2 or 3 views injury   ED Interpretation by Gautam Rome DO (02/03 1328)   Slight narrowing of the lower lumbar disks.  Mild scoliosis.  No acute orthopedic findings.                 Procedures  Trigger Injection 1 or 2 muscles    Date/Time: 2/3/2024 1:38 PM    Performed by: Gautam Rome DO  Authorized by: Gautam Rome DO    Patient location:  ED  Other Assisting Provider: No    Consent:     Consent obtained:  Verbal    Consent given by:  Patient    Risks discussed:  Allergic reaction, swelling, infection, bleeding, pain, unsuccessful block and intravenous injection    Alternatives discussed:  No treatment  Universal protocol:     Patient identity confirmed:  Verbally with patient  Location:     Body area:  Back    Injection Trigger Points:  1  Pre-procedure details:     Skin preparation:  Chloraprep  Skin anesthesia:     Skin anesthesia method:  None  Procedure details:     Needle gauge:  25 G    Anesthetic injected:  Ropivacaine 0.5%    Steroid injected:  None    Additive injected:  None    Injection procedure:  Anatomic landmarks identified, incremental injection, negative aspiration for blood, anatomic landmarks palpated and introduced needle  Post-procedure details:      Dressing:  None    Outcome:  Pain unchanged    Patient tolerance of procedure:  Tolerated well, no immediate complications           ED Course                                             Medical Decision Making  Patient with back pain.  No red flags.  Will evaluate for acute orthopedic injury though low suspicion.  X-ray shows no fracture.    Will refer to [facility].  Return precautions given.    Problems Addressed:  Sciatica of left side: acute illness or injury    Amount and/or Complexity of Data Reviewed  Radiology: ordered and independent interpretation performed.    Risk  OTC drugs.  Prescription drug management.             Disposition  Final diagnoses:   Sciatica of left side     Time reflects when diagnosis was documented in both MDM as applicable and the Disposition within this note       Time User Action Codes Description Comment    2/3/2024  1:36 PM Gautam Rome Add [M54.32] Sciatica of left side           ED Disposition       ED Disposition   Discharge    Condition   Stable    Date/Time   Sat Feb 3, 2024  1:36 PM    Comment   Dori Kitchen discharge to home/self care.                   Follow-up Information       Follow up With Specialties Details Why Contact Info Additional Information    [facility] Physical Therapy Schedule an appointment as soon as possible for a visit  For re-evaluation as soon as possible [phone]    [facility] Emergency Department Emergency Medicine  If symptoms worsen [address] [place] [id number] [phone] [facility] Emergency Department, [address] [place] [id number]            Patient's Medications   Discharge Prescriptions    ACETAMINOPHEN (TYLENOL) 500 MG TABLET    Take 2 tablets (1,000 mg total) by mouth every 6 (six) hours as needed for moderate pain       Start Date: 2/3/2024  End Date: --       Order Dose: 1,000 mg       Quantity: 40 tablet     Refills: 0    LIDOCAINE (LIDODERM) 5 %    Apply 1 patch topically over 12 hours daily Remove & Discard patch within 12 hours or as directed by MD       Start Date: 2/3/2024  End Date: --       Order Dose: 1 patch       Quantity: 30 patch    Refills: 0    METHOCARBAMOL (ROBAXIN) 500 MG TABLET    Take 1 tablet (500 mg total) by mouth 3 (three) times a day as needed for muscle spasms       Start Date: 2/3/2024  End Date: --       Order Dose: 500 mg       Quantity: 20 tablet    Refills: 0    NAPROXEN (NAPROSYN) 500 MG TABLET    Take 1 tablet (500 mg total) by mouth 2 (two) times a day with meals       Start Date: 2/3/2024  End Date: --       Order Dose: 500 mg       Quantity: 30 tablet    Refills: 0           PDMP Review       None            ED Provider  Electronically Signed by             Gautam Rome DO  02/03/24 1297

## 2024-02-05 ENCOUNTER — NURSE TRIAGE (OUTPATIENT)
Dept: PHYSICAL THERAPY | Facility: OTHER | Age: 49
End: 2024-02-05

## 2024-02-05 NOTE — TELEPHONE ENCOUNTER
Called the patient to complete the triage talya;l started today @ 3 pm. Call went to .    Voice message left for patient to call back. Phone number and hours of business provided.     Referral Closed.  Triage will be completed if a call back is received.

## 2024-02-05 NOTE — TELEPHONE ENCOUNTER
Additional Information   Negative: Is this related to a work injury?   Negative: Is this related to an MVA?   Negative: Are you currently recieving homecare services?    Background - Initial Assessment  Clinical complaint: ED visit on 02/03 due to Left Sided Lower Back pain and Sciatica. No previous hx of back pain. Hx of leg sx. Patient states back pain started on Tuesday 01/30. It radiates into the hip, buttock and left leg. She felt tingling sensation in left leg but it went away. No numbness. Seen by a Chiropractor for only 1 visit on Friday 02/02. NKI. Pain is constant, worse with any position. Patient described pain as sharp.  Date of onset: Tuesday 01/30  Frequency of pain: constant  Quality of pain: sharp    Protocols used: [facility] Protocol

## 2025-04-01 ENCOUNTER — OFFICE VISIT (OUTPATIENT)
Dept: FAMILY MEDICINE CLINIC | Facility: CLINIC | Age: 50
End: 2025-04-01

## 2025-04-01 VITALS
OXYGEN SATURATION: 99 % | HEIGHT: 65 IN | RESPIRATION RATE: 20 BRPM | HEART RATE: 73 BPM | DIASTOLIC BLOOD PRESSURE: 79 MMHG | BODY MASS INDEX: 29.59 KG/M2 | TEMPERATURE: 98 F | SYSTOLIC BLOOD PRESSURE: 117 MMHG | WEIGHT: 177.6 LBS

## 2025-04-01 DIAGNOSIS — R30.0 DYSURIA: Primary | ICD-10-CM

## 2025-04-01 DIAGNOSIS — Z59.71 DOES NOT HAVE HEALTH INSURANCE: ICD-10-CM

## 2025-04-01 LAB
SL AMB  POCT GLUCOSE, UA: ABNORMAL
SL AMB LEUKOCYTE ESTERASE,UA: ABNORMAL
SL AMB POCT BILIRUBIN,UA: ABNORMAL
SL AMB POCT BLOOD,UA: ABNORMAL
SL AMB POCT CLARITY,UA: ABNORMAL
SL AMB POCT COLOR,UA: ABNORMAL
SL AMB POCT KETONES,UA: ABNORMAL
SL AMB POCT NITRITE,UA: ABNORMAL
SL AMB POCT PH,UA: 7.5
SL AMB POCT SPECIFIC GRAVITY,UA: 1.01
SL AMB POCT URINE PROTEIN: ABNORMAL
SL AMB POCT UROBILINOGEN: ABNORMAL

## 2025-04-01 PROCEDURE — 81002 URINALYSIS NONAUTO W/O SCOPE: CPT | Performed by: FAMILY MEDICINE

## 2025-04-01 PROCEDURE — 99203 OFFICE O/P NEW LOW 30 MIN: CPT | Performed by: FAMILY MEDICINE

## 2025-04-01 RX ORDER — NITROFURANTOIN 25; 75 MG/1; MG/1
100 CAPSULE ORAL 2 TIMES DAILY
Qty: 10 CAPSULE | Refills: 0 | Status: SHIPPED | OUTPATIENT
Start: 2025-04-01 | End: 2025-04-06

## 2025-04-01 NOTE — PROGRESS NOTES
Name: Dori Kitchen      : 1975      MRN: [id number]  Encounter Provider: Mari Davalos MD  Encounter Date: 2025   Encounter department: [facility] [place]  :  Assessment & Plan  Dysuria  Patient presenting with 1 week of dysuria, pelvic pain and cramping denies any vaginal discharge no flank pain fevers chills nausea vomiting.  Point-of-care urine showing leukocytes.      Plan:  Given significant symptoms we will treat with 5-day course of Macrobid  Follow-up as needed  Will avoid urine send out for now as patient does not have insurance  If symptoms do not resolve with prescribed antibiotic, would recommend urine sent out with culture  Orders:    POCT urine dip    Does not have health insurance  Patient does not have health insurance and has a family history of breast cancer.  She is interested in getting set up with insurance or [employer] so that she can complete mammograms and other general cancer screenings.  Referral to social work placed on visit today.    Orders:    Ambulatory Referral to Social Work Care Management Program; Future           History of Present Illness   49 year old female presenting to [facility]. She moved here from [place] and no longer has insurance. She does think she may have a uti. She felt the need to constantly urinate and was passing very little urine with pelvic cramping. No new flank pain, fevers chills nausea and vomiting. No vaginal discharge.     Family history of breast cancer in mother at age of 60s. Interested in getting set up with insurance or [employer].      Review of Systems   Constitutional:  Negative for chills and fever.   HENT:  Negative for ear pain and sore throat.    Eyes:  Negative for pain and visual disturbance.   Respiratory:  Negative for cough and shortness of breath.    Cardiovascular:  Negative for chest pain and palpitations.   Gastrointestinal:  Negative for abdominal pain and vomiting.  "  Genitourinary:  Positive for decreased urine volume, dysuria, frequency and urgency. Negative for flank pain, hematuria, vaginal bleeding, vaginal discharge and vaginal pain.   Musculoskeletal:  Negative for arthralgias and back pain.   Skin:  Negative for color change and rash.   Neurological:  Negative for seizures and syncope.   All other systems reviewed and are negative.      Objective   /79   Pulse 73   Temp 98 °F (36.7 °C) (Temporal)   Resp 20   Ht 5' 5\" (1.651 m)   Wt 80.6 kg (177 lb 9.6 oz)   SpO2 99%   BMI 29.55 kg/m²      Physical Exam  Vitals and nursing note reviewed.   Constitutional:       General: She is not in acute distress.     Appearance: She is well-developed.   HENT:      Head: Normocephalic and atraumatic.   Eyes:      Conjunctiva/sclera: Conjunctivae normal.   Cardiovascular:      Rate and Rhythm: Normal rate and regular rhythm.      Heart sounds: No murmur heard.  Pulmonary:      Effort: Pulmonary effort is normal. No respiratory distress.      Breath sounds: Normal breath sounds.   Abdominal:      Palpations: Abdomen is soft.      Tenderness: There is no abdominal tenderness.   Musculoskeletal:         General: No swelling.      Cervical back: Neck supple.   Skin:     General: Skin is warm and dry.      Capillary Refill: Capillary refill takes less than 2 seconds.   Neurological:      Mental Status: She is alert.   Psychiatric:         Mood and Affect: Mood normal.           Mari Davalos M.D.  [facility] Medicine PGY2  4/1/2025, 5:17 PM    "

## 2025-04-03 ENCOUNTER — PATIENT OUTREACH (OUTPATIENT)
Dept: FAMILY MEDICINE CLINIC | Facility: CLINIC | Age: 50
End: 2025-04-03

## 2025-04-03 NOTE — PROGRESS NOTES
OP SUDHA received a referral from Dr. Davalos regarding pt w/o health insurance. Chart review completed. Per chart review, pt was seen on 4/1 to establish care in office. Per chart review, pt does not have health insurance. Per chart review, pt has family hx of breast cancer and is interested in getting set up w/ insurance or koffi care to complete mammograms and other general cancer screenings.     JONATHAN HULL placed call to pt w/ success. JONATHAN HULL introduced self, role and reason for calling. Pt stated that she has the SFS application completed and will be mailing that out along w/ her copies of paystubs to the [facility] financial counselors office. SW explained SFS to pt and advised if pt is interested in getting a mammogram screening done, pt can apply for [facility] and also get set up w/ their loree for mammogram screenings. SW advised this would be through the [facility] financial office. Pt expressed understanding.     JONATHAN HULL asked if pt would want to apply for insurance. Pt stated she had tried to apply through [employer], but the premiums were expensive. She stated she does not believe they qualify for MA as she tried to apply for her 22 year old daughter and she was denied. Pt stated her daughter is a student and does not work and goes based off pt and pt's husbands income. Pt works part time a few hours a week and pt's  collects SSI. Pt stated that her younger daughter who is 16 has CHIP.  JONATHAN HULL reviewed income guidelines for HH of 4 and advised pt of this information. Pt stated she believes they are under that number. JONATHAN will send information to [facility] Financial Counselors to see if they can assist pt w/ SFS and MA applications.    JONATHAN HULL advised pt that SW will remain available if pt has any further questions or needs. Pt expressed appreciation.

## 2025-05-13 ENCOUNTER — OFFICE VISIT (OUTPATIENT)
Dept: FAMILY MEDICINE CLINIC | Facility: CLINIC | Age: 50
End: 2025-05-13

## 2025-05-13 VITALS
RESPIRATION RATE: 18 BRPM | DIASTOLIC BLOOD PRESSURE: 81 MMHG | WEIGHT: 179 LBS | HEART RATE: 66 BPM | TEMPERATURE: 98.1 F | BODY MASS INDEX: 29.82 KG/M2 | OXYGEN SATURATION: 100 % | HEIGHT: 65 IN | SYSTOLIC BLOOD PRESSURE: 119 MMHG

## 2025-05-13 DIAGNOSIS — R30.0 DYSURIA: Primary | ICD-10-CM

## 2025-05-13 LAB
SL AMB  POCT GLUCOSE, UA: NEGATIVE
SL AMB LEUKOCYTE ESTERASE,UA: 125
SL AMB POCT BILIRUBIN,UA: NEGATIVE
SL AMB POCT BLOOD,UA: NEGATIVE
SL AMB POCT CLARITY,UA: CLEAR
SL AMB POCT COLOR,UA: YELLOW
SL AMB POCT KETONES,UA: 5
SL AMB POCT NITRITE,UA: NEGATIVE
SL AMB POCT PH,UA: 5
SL AMB POCT SPECIFIC GRAVITY,UA: 1
SL AMB POCT URINE PROTEIN: 15
SL AMB POCT UROBILINOGEN: 0.2

## 2025-05-13 PROCEDURE — 81002 URINALYSIS NONAUTO W/O SCOPE: CPT | Performed by: FAMILY MEDICINE

## 2025-05-13 PROCEDURE — 99213 OFFICE O/P EST LOW 20 MIN: CPT | Performed by: FAMILY MEDICINE

## 2025-05-13 RX ORDER — NITROFURANTOIN 25; 75 MG/1; MG/1
100 CAPSULE ORAL 2 TIMES DAILY
Qty: 10 CAPSULE | Refills: 1 | Status: SHIPPED | OUTPATIENT
Start: 2025-05-13 | End: 2025-05-18

## 2025-05-13 NOTE — ASSESSMENT & PLAN NOTE
For one day and history of UTI, responded to Nitrofurantoin. Today UA in office positive for leukocytes, negative nitrates or blood . Not regularly sexually active with  and no new partner. Possible cystitis and will consider urine culture if recurring, currently no coverage for labs or testing. Advised hydration with water. Avoid irritants.    Orders:    POCT urine dip    nitrofurantoin (MACROBID) 100 mg capsule; Take 1 capsule (100 mg total) by mouth 2 (two) times a day for 5 days

## 2025-05-13 NOTE — PROGRESS NOTES
"Name: Dori Kitchen      : 1975      MRN: [id number]  Encounter Provider: Maria G Yin MD  Encounter Date: 2025   Encounter department: [facility] [place]  :  Assessment & Plan  Dysuria  For one day and history of UTI, responded to Nitrofurantoin. Today UA in office positive for leukocytes, negative nitrates or blood . Not regularly sexually active with  and no new partner. Possible cystitis and will consider urine culture if recurring, currently no coverage for labs or testing. Advised hydration with water. Avoid irritants.    Orders:    POCT urine dip    nitrofurantoin (MACROBID) 100 mg capsule; Take 1 capsule (100 mg total) by mouth 2 (two) times a day for 5 days      FU PRN, return parameters discussed     History of Present Illness   Patient states started with burning of urination yesterday, not sure what cause is but likes earlier treatment. Feels that is hydrating with carbonated water. No other symptoms.      Review of Systems   Constitutional:  Negative for chills and fever.   Respiratory: Negative.     Cardiovascular: Negative.    Gastrointestinal:  Negative for constipation.   Psychiatric/Behavioral: Negative.         Objective   /81 (BP Location: Left arm, Patient Position: Sitting, Cuff Size: Large)   Pulse 66   Temp 98.1 °F (36.7 °C) (Temporal)   Resp 18   Ht 5' 5\" (1.651 m)   Wt 81.2 kg (179 lb)   SpO2 100%   BMI 29.79 kg/m²      Physical Exam  Constitutional:       General: She is not in acute distress.     Appearance: Normal appearance. She is well-developed.   HENT:      Head: Normocephalic and atraumatic.   Eyes:      Conjunctiva/sclera: Conjunctivae normal.   Neck:      Thyroid: No thyromegaly.   Cardiovascular:      Rate and Rhythm: Normal rate and regular rhythm.   Pulmonary:      Effort: Pulmonary effort is normal. No respiratory distress.      Breath sounds: Normal breath sounds.   Abdominal:      Palpations: Abdomen is " soft.   Musculoskeletal:      Cervical back: Normal range of motion and neck supple.   Skin:     General: Skin is warm and dry.   Neurological:      Mental Status: She is alert and oriented to person, place, and time.   Psychiatric:         Mood and Affect: Mood normal.         Behavior: Behavior normal.